# Patient Record
Sex: FEMALE | Race: WHITE | NOT HISPANIC OR LATINO | URBAN - METROPOLITAN AREA
[De-identification: names, ages, dates, MRNs, and addresses within clinical notes are randomized per-mention and may not be internally consistent; named-entity substitution may affect disease eponyms.]

---
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
## 2022-06-08 ENCOUNTER — APPOINTMENT (OUTPATIENT)
Dept: UROLOGY | Facility: CLINIC | Age: 22
End: 2022-06-08
Payer: COMMERCIAL

## 2022-06-08 VITALS
HEIGHT: 64 IN | WEIGHT: 103 LBS | BODY MASS INDEX: 17.58 KG/M2 | DIASTOLIC BLOOD PRESSURE: 67 MMHG | SYSTOLIC BLOOD PRESSURE: 97 MMHG | RESPIRATION RATE: 16 BRPM | OXYGEN SATURATION: 81 % | HEART RATE: 74 BPM | TEMPERATURE: 97.3 F

## 2022-06-08 DIAGNOSIS — N39.0 URINARY TRACT INFECTION, SITE NOT SPECIFIED: ICD-10-CM

## 2022-06-08 DIAGNOSIS — R39.89 OTHER SYMPTOMS AND SIGNS INVOLVING THE GENITOURINARY SYSTEM: ICD-10-CM

## 2022-06-08 PROCEDURE — 99204 OFFICE O/P NEW MOD 45 MIN: CPT

## 2022-06-08 RX ORDER — CEPHALEXIN 250 MG/1
250 TABLET ORAL
Qty: 30 | Refills: 2 | Status: ACTIVE | COMMUNITY
Start: 2022-06-08 | End: 1900-01-01

## 2022-06-08 NOTE — PHYSICAL EXAM
[General Appearance - Well Developed] : well developed [General Appearance - Well Nourished] : well nourished [Normal Appearance] : normal appearance [Well Groomed] : well groomed [General Appearance - In No Acute Distress] : no acute distress [Edema] : no peripheral edema [Respiration, Rhythm And Depth] : normal respiratory rhythm and effort [Exaggerated Use Of Accessory Muscles For Inspiration] : no accessory muscle use [Abdomen Soft] : soft [Abdomen Tenderness] : non-tender [Abdomen Mass (___ Cm)] : no abdominal mass palpated [Abdomen Hernia] : no hernia was discovered [Costovertebral Angle Tenderness] : no ~M costovertebral angle tenderness [FreeTextEntry1] : pvr- zero [Normal Station and Gait] : the gait and station were normal for the patient's age [] : no rash [No Focal Deficits] : no focal deficits [Oriented To Time, Place, And Person] : oriented to person, place, and time [Affect] : the affect was normal [Mood] : the mood was normal [Not Anxious] : not anxious [No Palpable Adenopathy] : no palpable adenopathy

## 2022-06-08 NOTE — HISTORY OF PRESENT ILLNESS
[FreeTextEntry1] : kamran here from [place] where she goes to college- for the summer\par states that in Dec 2021 went to ER for sxs of UTI: dysuria and SP pressure\par ua with 18 red cells and ucx negatie \par BERTA and PELVIC us : both negative for pathology\par due to flank pain --? pyelo and treated\par since then she sttes she has had 3 utis related to sex however in EMR:\par jan 2022: ucx - negative, May 2022: ucx -negative , June 2022: u cx negative\par normla menese but reports loose BM\par admits to increased water intake \par denies fever,  N/V or hematuria with UTI sxs \par currenlty NO sxs and currenlty NOT sexually active \par denies straining to void or BM but does c/o some pain in the past with sex\par here for f/u while here until August:

## 2022-06-08 NOTE — REVIEW OF SYSTEMS
[Feeling Tired] : feeling tired [Diarrhea] : diarrhea [see HPI] : see HPI [Hot Flashes] : hot flashes [Negative] : Heme/Lymph

## 2022-06-08 NOTE — ASSESSMENT
[FreeTextEntry1] : kamran here from [place] where she goes to college- for the summer\par states that in Dec 2021 went to ER for sxs of UTI: dysuria and SP pressure\par ua with 18 red cells and ucx negatie \par BERTA and PELVIC us : both negative for pathology\par due to flank pain --? pyelo and treated\par since then she sttes she has had 3 utis related to sex however in EMR:\par jan 2022: ucx - negative, May 2022: ucx -negative , June 2022: u cx negative\par normla menese but reports loose BM\par admits to increased water intake \par denies fever,  N/V or hematuria with UTI sxs \par currenlty NO sxs and currenlty NOT sexually active \par denies straining to void or BM but does c/o some pain in the past with sex\par here for f/u while here until August:\par 1- check urine \par 2- discussed PFD and warm baths and ani-inflam for now \par 3- discussed PT for PFD if no better \par 4- to rtc before she returns to college\par 5- consider cotial ppx with keflex - she agrees to try \par